# Patient Record
Sex: FEMALE | Race: OTHER | HISPANIC OR LATINO | URBAN - METROPOLITAN AREA
[De-identification: names, ages, dates, MRNs, and addresses within clinical notes are randomized per-mention and may not be internally consistent; named-entity substitution may affect disease eponyms.]

---

## 2021-03-05 ENCOUNTER — EMERGENCY (EMERGENCY)
Facility: HOSPITAL | Age: 51
LOS: 1 days | Discharge: HOME | End: 2021-03-05
Admitting: EMERGENCY MEDICINE
Payer: SELF-PAY

## 2021-03-05 VITALS
TEMPERATURE: 98 F | HEIGHT: 65 IN | HEART RATE: 97 BPM | RESPIRATION RATE: 18 BRPM | WEIGHT: 160.06 LBS | DIASTOLIC BLOOD PRESSURE: 93 MMHG | SYSTOLIC BLOOD PRESSURE: 148 MMHG | OXYGEN SATURATION: 100 %

## 2021-03-05 DIAGNOSIS — M79.89 OTHER SPECIFIED SOFT TISSUE DISORDERS: ICD-10-CM

## 2021-03-05 DIAGNOSIS — Z98.890 OTHER SPECIFIED POSTPROCEDURAL STATES: Chronic | ICD-10-CM

## 2021-03-05 DIAGNOSIS — M79.662 PAIN IN LEFT LOWER LEG: ICD-10-CM

## 2021-03-05 DIAGNOSIS — I10 ESSENTIAL (PRIMARY) HYPERTENSION: ICD-10-CM

## 2021-03-05 DIAGNOSIS — Z79.899 OTHER LONG TERM (CURRENT) DRUG THERAPY: ICD-10-CM

## 2021-03-05 PROCEDURE — 93971 EXTREMITY STUDY: CPT

## 2021-03-05 PROCEDURE — 93971 EXTREMITY STUDY: CPT | Mod: 26,LT

## 2021-03-05 PROCEDURE — 99284 EMERGENCY DEPT VISIT MOD MDM: CPT | Mod: 25

## 2021-03-05 PROCEDURE — 99284 EMERGENCY DEPT VISIT MOD MDM: CPT

## 2021-03-05 RX ORDER — IBUPROFEN 200 MG
600 TABLET ORAL ONCE
Refills: 0 | Status: COMPLETED | OUTPATIENT
Start: 2021-03-05 | End: 2021-03-05

## 2021-03-05 RX ADMIN — Medication 600 MILLIGRAM(S): at 16:53

## 2021-03-05 NOTE — ED PROVIDER NOTE - PATIENT PORTAL LINK FT
You can access the FollowMyHealth Patient Portal offered by [facility] by registering at the following website: [email or web address]. By joining [employer]’s FollowMyHealth portal, you will also be able to view your health information using other applications (apps) compatible with our system.

## 2021-03-05 NOTE — ED PROVIDER NOTE - MUSCULOSKELETAL, MLM
Spine appears normal, range of motion is not limited, no muscle or joint tenderness; L LE: arthroscopic incisions healed and scabbed over anterior knee, min swelling over anterior knee, + popliteal tend, no calf swelling, soft compartments, + light touch, no warmth, no palpable cords, no erythema, no rash, pulses 2+ b/l

## 2021-03-05 NOTE — ED PROVIDER NOTE - CLINICAL SUMMARY MEDICAL DECISION MAKING FREE TEXT BOX
pt sent in by her non [facility] orthopedist for eval of l leg pain and swelling, is 1 mon post knee surg, pt states that swelling and pain have remained unchanged, no sob/palpitations/redness or fevers, given ibuprofen for pain, doppler pt sent in by her non [facility] orthopedist for eval of l leg pain and swelling, is 1 mon post knee surg, pt states that swelling and pain have remained unchanged, no sob/palpitations/redness or fevers, given ibuprofen for pain, doppler neg, ace wrap for comfort, to RICE and prn ibuprofen, f/u w/ortho

## 2021-03-05 NOTE — ED PROVIDER NOTE - NSFOLLOWUPINSTRUCTIONS_ED_ALL_ED_FT
R.I.C.E. Treatment  WHAT YOU NEED TO KNOW:  R.I.C.E. treatment is a 4-step process used to decrease swelling and pain caused by an injury. R.I.C.E. stands for rest, ice, compression, and elevation. R.I.C.E. should be done within 24 to 48 hours after an injury.  Ice and Elevation  DISCHARGE INSTRUCTIONS:  Return to the emergency department if:   •Your pain is severe.  •You have severe swelling or deformity.  •You have numbness in the injured area.  Contact your healthcare provider if:   •Your pain and swelling does not go away after a few days.  •You have questions or concerns about your condition or care.  How to use R.I.C.E. treatment:   •Rest your injured area as directed. You may need to stop using, or keep weight off, the injury for 48 hours or longer. Your healthcare provider may recommend crutches or another device. Return to your usual activities as directed.   •Apply ice on your injured area for 15 to 20 minutes every 4 hours or as directed. Use an ice pack, or put crushed ice in a plastic bag. Cover it with a towel. Ice helps prevent tissue damage and decreases swelling and pain.  •Compress, or keep pressure on, the injured area. Compression will help decrease swelling and support the injured area. Use an elastic bandage, air stirrup, splint, or sling as directed. If you use an elastic bandage to wrap your injured area, make sure the bandage is not too tight.   •Elevate the injured area above the level of your heart as often as you can. This will help decrease swelling and pain. Prop the injured area on pillows or blankets to keep it elevated comfortably.

## 2021-03-05 NOTE — ED PROVIDER NOTE - OBJECTIVE STATEMENT
The pt is a 49 y/o F, who presents to ED c/o L calf pain and swelling s/p knee surg 1 mon ago. pt states that swelling and pain have been unchanged, pain is 7/10, constant but worse w/walking, has not taken any pain meds. Saw her orthopedist (non [facility]) today and told that needs a doppler. Denies numbness or tingling to toes, cp, sob, fevers, chills, pus or bleeding from incision sites
